# Patient Record
Sex: FEMALE | Race: WHITE | NOT HISPANIC OR LATINO | Employment: STUDENT | URBAN - NONMETROPOLITAN AREA
[De-identification: names, ages, dates, MRNs, and addresses within clinical notes are randomized per-mention and may not be internally consistent; named-entity substitution may affect disease eponyms.]

---
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
## 2024-02-05 DIAGNOSIS — R07.2 PRECORDIAL PAIN: ICD-10-CM

## 2024-02-05 DIAGNOSIS — I47.29 PVT (PAROXYSMAL VENTRICULAR TACHYCARDIA): ICD-10-CM

## 2024-02-05 DIAGNOSIS — I49.8 VENTRICULAR TRIGEMINY: ICD-10-CM

## 2024-02-05 DIAGNOSIS — I49.3 PVC (PREMATURE VENTRICULAR CONTRACTION): ICD-10-CM

## 2024-02-05 DIAGNOSIS — R00.2 PALPITATIONS: ICD-10-CM

## 2024-02-05 RX ORDER — FLECAINIDE ACETATE 50 MG/1
50 TABLET ORAL 2 TIMES DAILY
Qty: 60 TABLET | Refills: 11 | Status: SHIPPED | OUTPATIENT
Start: 2024-02-05

## 2024-04-18 ENCOUNTER — OFFICE VISIT (OUTPATIENT)
Dept: CARDIOLOGY | Facility: CLINIC | Age: 19
End: 2024-04-18
Payer: COMMERCIAL

## 2024-04-18 VITALS
OXYGEN SATURATION: 98 % | BODY MASS INDEX: 22.07 KG/M2 | HEIGHT: 67 IN | SYSTOLIC BLOOD PRESSURE: 103 MMHG | DIASTOLIC BLOOD PRESSURE: 69 MMHG | WEIGHT: 140.6 LBS | HEART RATE: 55 BPM

## 2024-04-18 DIAGNOSIS — I47.29 PVT (PAROXYSMAL VENTRICULAR TACHYCARDIA): ICD-10-CM

## 2024-04-18 DIAGNOSIS — I49.3 PVC (PREMATURE VENTRICULAR CONTRACTION): ICD-10-CM

## 2024-04-18 DIAGNOSIS — I49.8 VENTRICULAR TRIGEMINY: ICD-10-CM

## 2024-04-18 DIAGNOSIS — R00.2 PALPITATIONS: ICD-10-CM

## 2024-04-18 DIAGNOSIS — R07.2 PRECORDIAL PAIN: Primary | ICD-10-CM

## 2024-04-18 PROCEDURE — 1160F RVW MEDS BY RX/DR IN RCRD: CPT | Performed by: NURSE PRACTITIONER

## 2024-04-18 PROCEDURE — 1159F MED LIST DOCD IN RCRD: CPT | Performed by: NURSE PRACTITIONER

## 2024-04-18 PROCEDURE — 99213 OFFICE O/P EST LOW 20 MIN: CPT | Performed by: NURSE PRACTITIONER

## 2024-04-18 PROCEDURE — 93000 ELECTROCARDIOGRAM COMPLETE: CPT | Performed by: NURSE PRACTITIONER

## 2024-04-18 RX ORDER — FLECAINIDE ACETATE 50 MG/1
50 TABLET ORAL 2 TIMES DAILY
Qty: 180 TABLET | Refills: 3 | Status: SHIPPED | OUTPATIENT
Start: 2024-04-18

## 2024-04-18 RX ORDER — CETIRIZINE HYDROCHLORIDE 10 MG/1
10 TABLET ORAL DAILY
COMMUNITY

## 2024-04-18 NOTE — PROGRESS NOTES
Subjective     Kevon Lazcano is a 18 y.o. female who presents to day for 6 month follow up  and Slow Heart Rate.    CHIEF COMPLIANT  Chief Complaint   Patient presents with    6 month follow up     Slow Heart Rate       Active Problems:  Problem List Items Addressed This Visit    None  Visit Diagnoses       Precordial pain    -  Primary    Palpitations        PVC (premature ventricular contraction)                 Problem List :  1.Rapid heart rate .  1.1 cardiac monitor 12/22.  Average heart rate 71 bpm, maximum heart rate 37 maximum 140.  1 run of nonsustained ventricular tachycardia lasting 3 beats with a rate of 126 bpm 7.49% PVC burden with multiple episodes of bigeminy and trigeminy and occasional ventricular couplets.  2. SOB  2.1 echocardiogram 12/22: EF 56 to 60% normal diastolic function with trivial MR  3.  Chest pain  3.1 stress test 12/22: No EKG evidence of ischemia.  Frequent isolated ventricular ectopic beats during exercise this occurred as bigeminy, isolated PVCs, and occasional couplets but no sustained ectopy or block.  Peak heart rate 155    HPI  HPI  Ms. Kevon Lazcano is a 18-year-old female patient who is being followed up today for chest pain and bradycardia.  She is accompanied by an adult female.    Patient continues to have palpitations which she describes as a fluttering like sensation that occurs intermittently in her chest.  It occurs on a frequency of about 1 times a week and at times daily.  Accompanied by chest tightness.  She takes her medications in the morning and night.  She is on flecainide for antiarrhythmic therapy.  She was not a candidate for beta-blocker therapy due to baseline bradycardia.  These are unchanged nonprogressive.  PRIOR MEDS  Current Outpatient Medications on File Prior to Visit   Medication Sig Dispense Refill    cetirizine (zyrTEC) 10 MG tablet Take 1 tablet by mouth Daily.      flecainide (TAMBOCOR) 50 MG tablet TAKE 1 TABLET BY MOUTH TWICE A DAY 60 tablet  11    FLUOXETINE HCL PO Take 20 mg by mouth Daily. 1 1/2 tab po qd      guanFACINE (TENEX) 2 MG tablet Take 0.5 tablets by mouth Every Night.      Levonorgestrel (KYLEENA IU) by Intrauterine route.       No current facility-administered medications on file prior to visit.   ALLERGIES  Other and Sulfa antibiotics    HISTORY  Past Medical History:   Diagnosis Date    ADHD (attention deficit hyperactivity disorder)     Angina pectoris, unspecified     Asthma     Depression     Head injury     Suicide attempt      Social History     Socioeconomic History    Marital status: Single   Tobacco Use    Smoking status: Former     Current packs/day: 0.00     Average packs/day: 0.3 packs/day for 1 year (0.3 ttl pk-yrs)     Types: Cigarettes     Start date:      Quit date:      Years since quittin.2    Smokeless tobacco: Former     Quit date:    Substance and Sexual Activity    Alcohol use: Never    Drug use: Not Currently     Comment: tried ETOH    Sexual activity: Not Currently     Partners: Male     Birth control/protection: OCP       Family History   Problem Relation Age of Onset    Drug abuse Mother     Hypertension Father     Alcohol abuse Father     ADD / ADHD Father     Cancer Father     Schizophrenia Sister     Drug abuse Sister     ADD / ADHD Sister     Depression Sister     Cancer Maternal Grandmother     Heart disease Maternal Grandfather        Review of Systems   Constitutional:  Negative for chills, fatigue and fever.   HENT:  Negative for congestion, rhinorrhea and sore throat.    Eyes:  Negative for visual disturbance.   Respiratory:  Positive for chest tightness. Negative for apnea and shortness of breath.    Cardiovascular:  Positive for chest pain (sharp in center of chest). Negative for palpitations and leg swelling.   Gastrointestinal:  Negative for constipation, diarrhea and nausea.   Musculoskeletal:  Negative for arthralgias, back pain and neck pain.   Allergic/Immunologic: Positive for  "environmental allergies. Negative for food allergies.   Neurological:  Positive for weakness (only if she has syncope this usually is resolved with eating or resting). Negative for dizziness, syncope and light-headedness.   Hematological:  Does not bruise/bleed easily.   Psychiatric/Behavioral:  Negative for sleep disturbance.      Objective     VITALS: /69 (BP Location: Left arm, Patient Position: Sitting, Cuff Size: Adult)   Pulse 55   Ht 170.2 cm (67.01\")   Wt 63.8 kg (140 lb 9.6 oz)   SpO2 98%   BMI 22.02 kg/m²     LABS:   Lab Results (most recent)       None        IMAGING:   No Images in the past 120 days found..  EXAM:  Physical Exam  Vitals and nursing note reviewed.   Constitutional:       Appearance: She is well-developed.   HENT:      Head: Normocephalic.   Neck:      Thyroid: No thyroid mass.      Vascular: No carotid bruit or JVD.      Trachea: Trachea and phonation normal.   Cardiovascular:      Rate and Rhythm: Normal rate and regular rhythm.      Pulses:           Radial pulses are 2+ on the right side and 2+ on the left side.        Posterior tibial pulses are 2+ on the right side and 2+ on the left side.      Heart sounds: Normal heart sounds. No murmur heard.     No friction rub. No gallop.   Pulmonary:      Effort: Pulmonary effort is normal. No respiratory distress.      Breath sounds: Normal breath sounds. No wheezing or rales.   Musculoskeletal:         General: No swelling. Normal range of motion.      Cervical back: Neck supple.   Skin:     General: Skin is warm and dry.      Capillary Refill: Capillary refill takes less than 2 seconds.      Findings: No rash.   Neurological:      Mental Status: She is alert and oriented to person, place, and time.   Psychiatric:         Speech: Speech normal.         Behavior: Behavior normal.         Thought Content: Thought content normal.         Judgment: Judgment normal.     Procedure     ECG 12 Lead    Date/Time: 4/18/2024 2:15 " PM  Performed by: Roman King APRN    Authorized by: Roman King APRN  Rhythm: sinus arrhythmia  Rate: normal  BPM: 68  QRS axis: right  Other findings: non-specific ST-T wave changes  Comments: QTc 444 ms  No acute changes             Assessment & Plan    Diagnosis Plan   1. Precordial pain        2. Palpitations        3. PVC (premature ventricular contraction)        1.  Patient's chest pain and palpitations unchanged nonprogressive.  We will continue to follow at this time.  2.  Patient does have palpitations of PVCs with history of bigeminy trigeminy and nonsustained VT in the past.  She will continue the flecainide therapy.  Patient was not deemed appropriate for beta-blocker therapy due to baseline bradycardia.  3.  Informed of signs and symptoms of ACS and advised to seek emergent treatment for any new worsening symptoms.  Patient also advised sooner follow-up as needed.  Also advised to follow-up with family doctor as needed  This note is dictated utilizing voice recognition software.  Although this record has been proof read, transcriptional errors may still be present. If questions occur regarding the content of this record please do not hesitate to call our office.  I have reviewed and confirmed the accuracy of the ROS as documented by the MA/LPN/RN KILEY Jackson    No follow-ups on file.    Diagnoses and all orders for this visit:    1. Precordial pain (Primary)    2. Palpitations    3. PVC (premature ventricular contraction)    Other orders  -     ECG 12 Lead        Kevon Lazcano  reports that she quit smoking about 4 years ago. Her smoking use included cigarettes. She started smoking about 5 years ago. She has a 0.3 pack-year smoking history. She quit smokeless tobacco use about 7 years ago. I have educated her on the risk of diseases from using tobacco products. Patient does not smoke.           Pediatric BMI = 54 %ile (Z= 0.10) based on CDC (Girls, 2-20 Years) BMI-for-age based on  BMI available as of 4/18/2024.. BMI is within normal parameters. No other follow-up for BMI required.           MEDS ORDERED DURING VISIT:  No orders of the defined types were placed in this encounter.          This document has been electronically signed by Roman King Jr., KILEY  April 18, 2024 14:16 EDT

## 2024-10-21 ENCOUNTER — OFFICE VISIT (OUTPATIENT)
Dept: CARDIOLOGY | Facility: CLINIC | Age: 19
End: 2024-10-21
Payer: COMMERCIAL

## 2024-10-21 VITALS
HEART RATE: 58 BPM | OXYGEN SATURATION: 100 % | WEIGHT: 139.2 LBS | BODY MASS INDEX: 21.85 KG/M2 | SYSTOLIC BLOOD PRESSURE: 117 MMHG | HEIGHT: 67 IN | DIASTOLIC BLOOD PRESSURE: 77 MMHG

## 2024-10-21 DIAGNOSIS — R07.2 PRECORDIAL PAIN: ICD-10-CM

## 2024-10-21 DIAGNOSIS — R00.2 PALPITATIONS: Primary | ICD-10-CM

## 2024-10-21 DIAGNOSIS — I49.8 VENTRICULAR TRIGEMINY: ICD-10-CM

## 2024-10-21 DIAGNOSIS — I47.29 PVT (PAROXYSMAL VENTRICULAR TACHYCARDIA): ICD-10-CM

## 2024-10-21 DIAGNOSIS — R00.1 BRADYCARDIA, SINUS: ICD-10-CM

## 2024-10-21 DIAGNOSIS — I49.3 PVC (PREMATURE VENTRICULAR CONTRACTION): ICD-10-CM

## 2024-10-21 PROCEDURE — 93000 ELECTROCARDIOGRAM COMPLETE: CPT | Performed by: NURSE PRACTITIONER

## 2024-10-21 PROCEDURE — 1159F MED LIST DOCD IN RCRD: CPT | Performed by: NURSE PRACTITIONER

## 2024-10-21 PROCEDURE — 99214 OFFICE O/P EST MOD 30 MIN: CPT | Performed by: NURSE PRACTITIONER

## 2024-10-21 PROCEDURE — 1160F RVW MEDS BY RX/DR IN RCRD: CPT | Performed by: NURSE PRACTITIONER

## 2024-10-21 NOTE — PROGRESS NOTES
Subjective     Kevon Lazcano is a 19 y.o. female who presents to day for 6 month  follow up  and Slow Heart Rate.    CHIEF COMPLIANT  Chief Complaint   Patient presents with    6 month  follow up     Slow Heart Rate       Active Problems:  Problem List Items Addressed This Visit    None  Visit Diagnoses       Palpitations    -  Primary    Relevant Orders    ECG 12 Lead    Holter Monitor - 72 Hour Up To 15 Days    Ventricular trigeminy        Relevant Orders    ECG 12 Lead    Holter Monitor - 72 Hour Up To 15 Days    PVC (premature ventricular contraction)        Relevant Orders    ECG 12 Lead    Holter Monitor - 72 Hour Up To 15 Days    PVT (paroxysmal ventricular tachycardia)        Relevant Orders    ECG 12 Lead    Holter Monitor - 72 Hour Up To 15 Days    Bradycardia, sinus        Relevant Orders    ECG 12 Lead    Holter Monitor - 72 Hour Up To 15 Days    Precordial pain        Relevant Orders    ECG 12 Lead    Holter Monitor - 72 Hour Up To 15 Days        Problem List :  1.Rapid heart rate .  1.1 cardiac monitor 12/22.  Average heart rate 71 bpm, maximum heart rate 37 maximum 140.  1 run of nonsustained ventricular tachycardia lasting 3 beats with a rate of 126 bpm 7.49% PVC burden with multiple episodes of bigeminy and trigeminy and occasional ventricular couplets.  2. SOB  2.1 echocardiogram 12/22: EF 56 to 60% normal diastolic function with trivial MR  3.  Chest pain  3.1 stress test 12/22: No EKG evidence of ischemia.  Frequent isolated ventricular ectopic beats during exercise this occurred as bigeminy, isolated PVCs, and occasional couplets but no sustained ectopy or block.  Peak heart rate 155    HPI  HPI  MsChanelle Lazcano is a 19-year-old female patient being followed up today for palpitations.    Patient does have a significant history of palpitations in which she did wear a cardiac event monitor previously.  The event monitor did identify an Average heart rate 71 bpm, maximum heart rate 37 maximum  140.  1 run of nonsustained ventricular tachycardia lasting 3 beats with a rate of 126 bpm 7.49% PVC burden with multiple episodes of bigeminy and trigeminy and occasional ventricular couplets.  At this time due to baseline bradycardia she was placed on flecainide.    Patient does have palpitations in which she has an intermittent fluttering like sensation that occurs in her chest about 1 time a day lasting up to 30 minutes.  She did have unifocal PVCs noted on her EKG today.  She has associated chest pain that sharp in nature that occurs with her palpitations.  She does say these are improved since starting the flecainide but still occur on a daily basis as well.  PRIOR MEDS  Current Outpatient Medications on File Prior to Visit   Medication Sig Dispense Refill    cetirizine (zyrTEC) 10 MG tablet Take 1 tablet by mouth Daily.      flecainide (TAMBOCOR) 50 MG tablet Take 1 tablet by mouth 2 (Two) Times a Day. 180 tablet 3    Levonorgestrel (KYLEENA IU) by Intrauterine route.      [DISCONTINUED] FLUOXETINE HCL PO Take 20 mg by mouth Daily. 1 1/2 tab po qd      [DISCONTINUED] guanFACINE (TENEX) 2 MG tablet Take 0.5 tablets by mouth Every Night.       No current facility-administered medications on file prior to visit.       ALLERGIES  Other and Sulfa antibiotics    HISTORY  Past Medical History:   Diagnosis Date    ADHD (attention deficit hyperactivity disorder)     Angina pectoris, unspecified     Asthma     Depression     Head injury     Suicide attempt        Social History     Socioeconomic History    Marital status: Single   Tobacco Use    Smoking status: Former     Current packs/day: 0.00     Average packs/day: 0.3 packs/day for 1 year (0.3 ttl pk-yrs)     Types: Cigarettes     Start date:      Quit date:      Years since quittin.8    Smokeless tobacco: Former     Quit date:    Substance and Sexual Activity    Alcohol use: Never    Drug use: Not Currently     Comment: tried ETOH    Sexual  "activity: Not Currently     Partners: Male     Birth control/protection: OCP       Family History   Problem Relation Age of Onset    Drug abuse Mother     Hypertension Father     Alcohol abuse Father     ADD / ADHD Father     Cancer Father     Schizophrenia Sister     Drug abuse Sister     ADD / ADHD Sister     Depression Sister     Cancer Maternal Grandmother     Heart disease Maternal Grandfather        Review of Systems   Constitutional:  Negative for chills, fatigue and fever.   HENT:  Negative for congestion, rhinorrhea and sore throat.    Eyes:  Negative for visual disturbance.   Respiratory:  Positive for chest tightness. Negative for apnea and shortness of breath.    Cardiovascular:  Positive for chest pain (sharp sometimes) and palpitations (flutters). Negative for leg swelling.   Gastrointestinal:  Negative for constipation, diarrhea and nausea.   Musculoskeletal:  Negative for arthralgias, back pain and neck pain.   Skin:  Negative for rash and wound.   Allergic/Immunologic: Positive for environmental allergies. Negative for food allergies.   Neurological:  Positive for dizziness (if she takes a hot shower) and light-headedness. Negative for syncope and weakness.   Hematological:  Does not bruise/bleed easily.   Psychiatric/Behavioral:  Negative for sleep disturbance.        Objective     VITALS: /77 (BP Location: Right arm, Patient Position: Sitting, Cuff Size: Adult)   Pulse 58   Ht 170.2 cm (67.01\")   Wt 63.1 kg (139 lb 3.2 oz)   SpO2 100%   BMI 21.80 kg/m²     LABS:   Lab Results (most recent)       None            IMAGING:   No Images in the past 120 days found..    EXAM:  Physical Exam  Vitals and nursing note reviewed.   Constitutional:       Appearance: She is well-developed.   HENT:      Head: Normocephalic.   Neck:      Thyroid: No thyroid mass.      Vascular: No carotid bruit or JVD.      Trachea: Trachea and phonation normal.   Cardiovascular:      Rate and Rhythm: Normal rate and " regular rhythm. Frequent Extrasystoles are present.     Pulses:           Radial pulses are 2+ on the right side and 2+ on the left side.        Posterior tibial pulses are 2+ on the right side and 2+ on the left side.      Heart sounds: Normal heart sounds. No murmur heard.     No friction rub. No gallop.   Pulmonary:      Effort: Pulmonary effort is normal. No respiratory distress.      Breath sounds: Normal breath sounds. No wheezing or rales.   Musculoskeletal:         General: No swelling. Normal range of motion.      Cervical back: Neck supple.   Skin:     General: Skin is warm and dry.      Capillary Refill: Capillary refill takes less than 2 seconds.      Findings: No rash.   Neurological:      Mental Status: She is alert and oriented to person, place, and time.   Psychiatric:         Speech: Speech normal.         Behavior: Behavior normal.         Thought Content: Thought content normal.         Judgment: Judgment normal.         Procedure     ECG 12 Lead    Date/Time: 10/21/2024 1:33 PM  Performed by: Roman King APRN    Authorized by: Roman King APRN  Comparison: compared with previous ECG from 4/18/2024  Similar to previous ECG  Rhythm: sinus rhythm  Ectopy: unifocal PVCs  Rate: normal  BPM: 82  QRS axis: right  Other findings: non-specific ST-T wave changes  Comments:  MS  nO ACUTE CHANGES             Assessment & Plan    Diagnosis Plan   1. Palpitations  ECG 12 Lead    Holter Monitor - 72 Hour Up To 15 Days      2. Ventricular trigeminy  ECG 12 Lead    Holter Monitor - 72 Hour Up To 15 Days      3. PVC (premature ventricular contraction)  ECG 12 Lead    Holter Monitor - 72 Hour Up To 15 Days      4. PVT (paroxysmal ventricular tachycardia)  ECG 12 Lead    Holter Monitor - 72 Hour Up To 15 Days      5. Bradycardia, sinus  ECG 12 Lead    Holter Monitor - 72 Hour Up To 15 Days      6. Precordial pain  ECG 12 Lead    Holter Monitor - 72 Hour Up To 15 Days      1.  Due to patient's  ongoing palpitations as well as PVCs on her monitor today despite antiarrhythmic therapy of flecainide I would like for her to have a repeat Holter monitor for 2 weeks to determine the frequency of her palpitations.    2.  Patient does report chest pain that only occurs with the palpitations.  This does seem to be less frequent than what it was previously.    3.  Informed of signs and symptoms of ACS and advised to seek emergent treatment for any new worsening symptoms.  Patient also advised sooner follow-up as needed.  Also advised to follow-up with family doctor as needed  This note is dictated utilizing voice recognition software.  Although this record has been proof read, transcriptional errors may still be present. If questions occur regarding the content of this record please do not hesitate to call our office.  I have reviewed and confirmed the accuracy of the ROS as documented by the MA/LPN/RN KILEY Jackson  Return in about 6 months (around 4/21/2025), or if symptoms worsen or fail to improve.    Diagnoses and all orders for this visit:    1. Palpitations (Primary)  -     ECG 12 Lead  -     Holter Monitor - 72 Hour Up To 15 Days; Future    2. Ventricular trigeminy  -     ECG 12 Lead  -     Holter Monitor - 72 Hour Up To 15 Days; Future    3. PVC (premature ventricular contraction)  -     ECG 12 Lead  -     Holter Monitor - 72 Hour Up To 15 Days; Future    4. PVT (paroxysmal ventricular tachycardia)  -     ECG 12 Lead  -     Holter Monitor - 72 Hour Up To 15 Days; Future    5. Bradycardia, sinus  -     ECG 12 Lead  -     Holter Monitor - 72 Hour Up To 15 Days; Future    6. Precordial pain  -     ECG 12 Lead  -     Holter Monitor - 72 Hour Up To 15 Days; Future        Kevon Lazcano  reports that she quit smoking about 4 years ago. Her smoking use included cigarettes. She started smoking about 5 years ago. She has a 0.3 pack-year smoking history. She quit smokeless tobacco use about 7 years ago. I have  educated her on the risk of diseases from using tobacco products. Patient does not smoke.            BMI is within normal parameters. No other follow-up for BMI required.           MEDS ORDERED DURING VISIT:  No orders of the defined types were placed in this encounter.          This document has been electronically signed by Roman King Jr., APRN  October 21, 2024 13:58 EDT

## 2025-01-07 ENCOUNTER — TELEPHONE (OUTPATIENT)
Dept: CARDIOLOGY | Facility: CLINIC | Age: 20
End: 2025-01-07
Payer: COMMERCIAL

## 2025-01-07 NOTE — TELEPHONE ENCOUNTER
RELAY    I called and left a message on voicemail with monitor results as follows:    Improvement of PVC burden from 7.9% down to 1%.  Will discuss further on follow-up.

## 2025-04-22 ENCOUNTER — OFFICE VISIT (OUTPATIENT)
Dept: CARDIOLOGY | Facility: CLINIC | Age: 20
End: 2025-04-22
Payer: COMMERCIAL

## 2025-04-22 VITALS
WEIGHT: 141.4 LBS | SYSTOLIC BLOOD PRESSURE: 109 MMHG | OXYGEN SATURATION: 99 % | DIASTOLIC BLOOD PRESSURE: 71 MMHG | HEART RATE: 55 BPM | BODY MASS INDEX: 22.19 KG/M2 | HEIGHT: 67 IN

## 2025-04-22 DIAGNOSIS — R00.2 PALPITATIONS: ICD-10-CM

## 2025-04-22 DIAGNOSIS — I49.3 PVC (PREMATURE VENTRICULAR CONTRACTION): ICD-10-CM

## 2025-04-22 DIAGNOSIS — I47.20 PVT (PAROXYSMAL VENTRICULAR TACHYCARDIA): ICD-10-CM

## 2025-04-22 DIAGNOSIS — I49.8 VENTRICULAR TRIGEMINY: ICD-10-CM

## 2025-04-22 DIAGNOSIS — R07.2 PRECORDIAL PAIN: Primary | ICD-10-CM

## 2025-04-22 PROCEDURE — 93000 ELECTROCARDIOGRAM COMPLETE: CPT | Performed by: NURSE PRACTITIONER

## 2025-04-22 PROCEDURE — 1160F RVW MEDS BY RX/DR IN RCRD: CPT | Performed by: NURSE PRACTITIONER

## 2025-04-22 PROCEDURE — 99214 OFFICE O/P EST MOD 30 MIN: CPT | Performed by: NURSE PRACTITIONER

## 2025-04-22 PROCEDURE — 1159F MED LIST DOCD IN RCRD: CPT | Performed by: NURSE PRACTITIONER

## 2025-04-22 RX ORDER — FLECAINIDE ACETATE 50 MG/1
50 TABLET ORAL 2 TIMES DAILY
Qty: 180 TABLET | Refills: 3 | Status: SHIPPED | OUTPATIENT
Start: 2025-04-22 | End: 2025-04-23 | Stop reason: SDUPTHER

## 2025-04-22 NOTE — PROGRESS NOTES
Subjective     Kevon Lazcano is a 19 y.o. female who presents to day for Follow-up, Palpitations (Racing), and Chest Pain (Patient had some chest pain today when cleaning out the car. She has this off and on .).    CHIEF COMPLIANT  Chief Complaint   Patient presents with    Follow-up    Palpitations     Racing    Chest Pain     Patient had some chest pain today when cleaning out the car. She has this off and on .       Active Problems:  Problem List Items Addressed This Visit    None  Visit Diagnoses         Precordial pain    -  Primary    Relevant Orders    ECG 12 Lead    Treadmill Stress Test      Palpitations        Relevant Orders    ECG 12 Lead    Treadmill Stress Test      PVC (premature ventricular contraction)        Relevant Orders    ECG 12 Lead    Treadmill Stress Test      Ventricular trigeminy        Relevant Orders    ECG 12 Lead    Treadmill Stress Test      PVT (paroxysmal ventricular tachycardia)        Relevant Orders    ECG 12 Lead    Treadmill Stress Test        Problem List :  1.Rapid heart rate .  1.1 cardiac monitor 12/22.  Average heart rate 71 bpm, maximum heart rate 37 maximum 140.  1 run of nonsustained ventricular tachycardia lasting 3 beats with a rate of 126 bpm 7.49% PVC burden with multiple episodes of bigeminy and trigeminy and occasional ventricular couplets.  2. SOB  2.1 echocardiogram 12/22: EF 56 to 60% normal diastolic function with trivial MR  3.  Chest pain  3.1 stress test 12/22: No EKG evidence of ischemia.  Frequent isolated ventricular ectopic beats during exercise this occurred as bigeminy, isolated PVCs, and occasional couplets but no sustained ectopy or block.  Peak heart rate 155    HPI  HPI  Ms. Kevon Lazcano is a 19-year-old female patient being followed up today for palpitatons.    Due to exacerbation of palpitations patient did wear a Holter monitor for reevaluation.  She had no reported symptoms.  She is predominantly in a sinus rhythm.  She did have an episode  of 1-1 AV block at 11:55 PM and at 7:28 AM.  She had a PVC burden of 1% which was 7.9% prior to flecainide therapy.  She had an episode of ventricular bigeminy lasting up to 8.3 seconds and episodes of ventricular trigeminy lasting up to 5.4 seconds.    Patient does report chest pain that occurred as recent as today.  She says that it occurs both with rest and exertion.  Said today when she developed the chest pain that lasted for about a minute she was cleaning out her car.  She denied any associated symptoms.  Says her chest pain still occurs about 1 time a week.    Patient does have palpitations where she has intermittent racing sensation that occurs in her chest.  Previously patient had a 7.49% PVC burden with multiple episodes of bigeminy and trigeminy.  A monitor was repeated as discussed above and her PVC burden had dropped to approximately 1%.    Patient denies any shortness of breath, lower extremity edema, fatigue, syncope, PND, orthopnea, or strokelike symptoms.  PRIOR MEDS  Current Outpatient Medications on File Prior to Visit   Medication Sig Dispense Refill    cetirizine (zyrTEC) 10 MG tablet Take 1 tablet by mouth Daily.      Levonorgestrel (KYLEENA IU) by Intrauterine route.       No current facility-administered medications on file prior to visit.       ALLERGIES  Other and Sulfa antibiotics    HISTORY  Past Medical History:   Diagnosis Date    ADHD (attention deficit hyperactivity disorder)     Angina pectoris, unspecified     Asthma     Depression     Head injury     Suicide attempt        Social History     Socioeconomic History    Marital status: Single   Tobacco Use    Smoking status: Former     Current packs/day: 0.00     Average packs/day: 0.3 packs/day for 1 year (0.3 ttl pk-yrs)     Types: Cigarettes     Start date:      Quit date:      Years since quittin.3    Smokeless tobacco: Former     Quit date:    Substance and Sexual Activity    Alcohol use: Never    Drug use: Not  "Currently     Comment: tried ETOH    Sexual activity: Not Currently     Partners: Male     Birth control/protection: OCP       Family History   Problem Relation Age of Onset    Drug abuse Mother     Hypertension Father     Alcohol abuse Father     ADD / ADHD Father     Cancer Father     Schizophrenia Sister     Drug abuse Sister     ADD / ADHD Sister     Depression Sister     Cancer Maternal Grandmother     Heart disease Maternal Grandfather        Review of Systems   Constitutional:  Negative for chills, fatigue and fever.   HENT:  Negative for congestion, rhinorrhea and sore throat.    Eyes:  Negative for visual disturbance.   Respiratory:  Negative for apnea, chest tightness and shortness of breath.    Cardiovascular:  Positive for chest pain (had some chest pain today cleaning out the car) and palpitations (races). Negative for leg swelling.   Gastrointestinal:  Negative for constipation, diarrhea and nausea.   Musculoskeletal:  Negative for arthralgias, back pain and neck pain.   Skin:  Negative for rash and wound.   Allergic/Immunologic: Positive for environmental allergies and food allergies (lilly magana).   Neurological:  Negative for dizziness, syncope, weakness and light-headedness.   Hematological:  Bruises/bleeds easily (bruise).   Psychiatric/Behavioral:  Negative for sleep disturbance.        Objective     VITALS: /71 (BP Location: Right arm, Patient Position: Sitting, Cuff Size: Adult)   Pulse 55   Ht 170.2 cm (67.01\")   Wt 64.1 kg (141 lb 6.4 oz)   SpO2 99%   BMI 22.14 kg/m²     LABS:   Lab Results (most recent)       None            IMAGING:   No Images in the past 120 days found..    EXAM:  Physical Exam  Vitals and nursing note reviewed.   Constitutional:       Appearance: She is well-developed.   HENT:      Head: Normocephalic.   Neck:      Thyroid: No thyroid mass.      Vascular: No carotid bruit or JVD.      Trachea: Trachea and phonation normal.   Cardiovascular:      Rate and " Rhythm: Normal rate and regular rhythm.      Pulses:           Radial pulses are 2+ on the right side and 2+ on the left side.        Posterior tibial pulses are 2+ on the right side and 2+ on the left side.      Heart sounds: Normal heart sounds. No murmur heard.     No friction rub. No gallop.   Pulmonary:      Effort: Pulmonary effort is normal. No respiratory distress.      Breath sounds: Normal breath sounds. No wheezing or rales.   Musculoskeletal:         General: No swelling. Normal range of motion.      Cervical back: Neck supple.   Skin:     General: Skin is warm and dry.      Capillary Refill: Capillary refill takes less than 2 seconds.      Findings: No rash.   Neurological:      Mental Status: She is alert and oriented to person, place, and time.   Psychiatric:         Speech: Speech normal.         Behavior: Behavior normal.         Thought Content: Thought content normal.         Judgment: Judgment normal.         Procedure     ECG 12 Lead    Date/Time: 4/22/2025 2:38 PM  Performed by: Roman King APRN    Authorized by: Roman King APRN  Comparison: compared with previous ECG from 10/21/2024  Comparison to previous ECG: No nonspecific changes in lead III  Rhythm: sinus arrhythmia  Rate: normal  BPM: 66  QRS axis: right  Other findings: non-specific ST-T wave changes  Comments: QTc 384 ms  No acute changes             Assessment & Plan    Diagnosis Plan   1. Precordial pain  ECG 12 Lead    Treadmill Stress Test      2. Palpitations  ECG 12 Lead    Treadmill Stress Test      3. PVC (premature ventricular contraction)  ECG 12 Lead    Treadmill Stress Test      4. Ventricular trigeminy  ECG 12 Lead    Treadmill Stress Test      5. PVT (paroxysmal ventricular tachycardia)  ECG 12 Lead    Treadmill Stress Test      1.  Due to patient's continuation of chest pain and history of PVC burden I would like for her to repeat treadmill stress test to evaluate for ischemia or exercise-induced  dysrhythmia.  2.  Due to the significant reduction of palpitations with the flecainide from a PVC burden nearing 8% down to 1% we will continue without change.  3.  Informed of signs and symptoms of ACS and advised to seek emergent treatment for any new worsening symptoms.  Patient also advised sooner follow-up as needed.  Also advised to follow-up with family doctor as needed  This note is dictated utilizing voice recognition software.  Although this record has been proof read, transcriptional errors may still be present. If questions occur regarding the content of this record please do not hesitate to call our office.  I have reviewed and confirmed the accuracy of the ROS as documented by the MA/LPN/RN KILEY Jackson    Return if symptoms worsen or fail to improve, for Next scheduled follow up.    Diagnoses and all orders for this visit:    1. Precordial pain (Primary)  -     ECG 12 Lead  -     Discontinue: flecainide (TAMBOCOR) 50 MG tablet; Take 1 tablet by mouth 2 (Two) Times a Day.  Dispense: 180 tablet; Refill: 3  -     Treadmill Stress Test; Future    2. Palpitations  -     ECG 12 Lead  -     Discontinue: flecainide (TAMBOCOR) 50 MG tablet; Take 1 tablet by mouth 2 (Two) Times a Day.  Dispense: 180 tablet; Refill: 3  -     Treadmill Stress Test; Future    3. PVC (premature ventricular contraction)  -     ECG 12 Lead  -     Discontinue: flecainide (TAMBOCOR) 50 MG tablet; Take 1 tablet by mouth 2 (Two) Times a Day.  Dispense: 180 tablet; Refill: 3  -     Treadmill Stress Test; Future    4. Ventricular trigeminy  -     ECG 12 Lead  -     Discontinue: flecainide (TAMBOCOR) 50 MG tablet; Take 1 tablet by mouth 2 (Two) Times a Day.  Dispense: 180 tablet; Refill: 3  -     Treadmill Stress Test; Future    5. PVT (paroxysmal ventricular tachycardia)  -     ECG 12 Lead  -     Discontinue: flecainide (TAMBOCOR) 50 MG tablet; Take 1 tablet by mouth 2 (Two) Times a Day.  Dispense: 180 tablet; Refill: 3  -      Treadmill Stress Test; Future        Kevon Lazcano  reports that she quit smoking about 5 years ago. Her smoking use included cigarettes. She started smoking about 6 years ago. She has a 0.3 pack-year smoking history. She quit smokeless tobacco use about 8 years ago. I have educated her on the risk of diseases from using tobacco products. Patient does not smoke.             DO YOU VAPE OR USE SMOKELESS TOBACCO PRODUCTS?No    BMI is within normal parameters. No other follow-up for BMI required.           MEDS ORDERED DURING VISIT:  No orders of the defined types were placed in this encounter.          This document has been electronically signed by Roman King Jr., APRN  April 25, 2025 08:48 EDT

## 2025-04-23 ENCOUNTER — TELEPHONE (OUTPATIENT)
Dept: CARDIOLOGY | Facility: CLINIC | Age: 20
End: 2025-04-23
Payer: COMMERCIAL

## 2025-04-23 DIAGNOSIS — R00.2 PALPITATIONS: ICD-10-CM

## 2025-04-23 DIAGNOSIS — R07.2 PRECORDIAL PAIN: ICD-10-CM

## 2025-04-23 DIAGNOSIS — I47.20 PVT (PAROXYSMAL VENTRICULAR TACHYCARDIA): ICD-10-CM

## 2025-04-23 DIAGNOSIS — I49.8 VENTRICULAR TRIGEMINY: ICD-10-CM

## 2025-04-23 DIAGNOSIS — I49.3 PVC (PREMATURE VENTRICULAR CONTRACTION): ICD-10-CM

## 2025-04-23 RX ORDER — FLECAINIDE ACETATE 50 MG/1
50 TABLET ORAL 2 TIMES DAILY
Qty: 180 TABLET | Refills: 3 | Status: SHIPPED | OUTPATIENT
Start: 2025-04-23 | End: 2025-04-24 | Stop reason: SDUPTHER

## 2025-04-24 DIAGNOSIS — R07.2 PRECORDIAL PAIN: ICD-10-CM

## 2025-04-24 DIAGNOSIS — R00.2 PALPITATIONS: ICD-10-CM

## 2025-04-24 DIAGNOSIS — I47.20 PVT (PAROXYSMAL VENTRICULAR TACHYCARDIA): ICD-10-CM

## 2025-04-24 DIAGNOSIS — I49.3 PVC (PREMATURE VENTRICULAR CONTRACTION): ICD-10-CM

## 2025-04-24 DIAGNOSIS — I49.8 VENTRICULAR TRIGEMINY: ICD-10-CM

## 2025-04-24 RX ORDER — FLECAINIDE ACETATE 50 MG/1
50 TABLET ORAL EVERY 12 HOURS SCHEDULED
Qty: 180 TABLET | Refills: 3 | Status: SHIPPED | OUTPATIENT
Start: 2025-04-24

## 2025-04-24 RX ORDER — FLECAINIDE ACETATE 50 MG/1
50 TABLET ORAL 2 TIMES DAILY
Qty: 180 TABLET | Refills: 3 | Status: SHIPPED | OUTPATIENT
Start: 2025-04-24 | End: 2025-04-24

## 2025-04-24 NOTE — TELEPHONE ENCOUNTER
Caller: Neno Kevon    Relationship: Self    Best call back number: [phone]    Requested Prescriptions:   Requested Prescriptions     Pending Prescriptions Disp Refills    flecainide (TAMBOCOR) 50 MG tablet 180 tablet 3     Sig: Take 1 tablet by mouth 2 (Two) Times a Day.        Pharmacy where request should be sent: [id number] [place] [facility] [address] [address] - [phone] [facility] [phone] FX     Last office visit with prescribing clinician: 4/22/2025   Last telemedicine visit with prescribing clinician: Visit date not found   Next office visit with prescribing clinician: 9/23/2025     Additional details provided by patient: PHARMACY IS SAYING THEY NEVER GOT REFILL PLEASE ADVISE    Does the patient have less than a 3 day supply:  [x] Yes  [] No    Would you like a call back once the refill request has been completed: [x] Yes [] No    If the office needs to give you a call back, can they leave a voicemail: [x] Yes [] No    Omar Denise Rep   04/24/25 09:43 EDT

## 2025-05-21 ENCOUNTER — HOSPITAL ENCOUNTER (OUTPATIENT)
Dept: CARDIOLOGY | Facility: HOSPITAL | Age: 20
Discharge: HOME | End: 2025-05-21
Admitting: NURSE PRACTITIONER
Payer: COMMERCIAL

## 2025-05-21 ENCOUNTER — TELEPHONE (OUTPATIENT)
Dept: CARDIOLOGY | Facility: CLINIC | Age: 20
End: 2025-05-21
Payer: COMMERCIAL

## 2025-05-21 DIAGNOSIS — I49.3 PVC (PREMATURE VENTRICULAR CONTRACTION): ICD-10-CM

## 2025-05-21 DIAGNOSIS — R07.2 PRECORDIAL PAIN: ICD-10-CM

## 2025-05-21 DIAGNOSIS — I47.20 PVT (PAROXYSMAL VENTRICULAR TACHYCARDIA): ICD-10-CM

## 2025-05-21 DIAGNOSIS — R00.2 PALPITATIONS: ICD-10-CM

## 2025-05-21 DIAGNOSIS — I49.8 VENTRICULAR TRIGEMINY: ICD-10-CM

## 2025-05-21 PROCEDURE — 93017 CV STRESS TEST TRACING ONLY: CPT

## 2025-05-22 ENCOUNTER — TELEPHONE (OUTPATIENT)
Dept: CARDIOLOGY | Facility: CLINIC | Age: 20
End: 2025-05-22
Payer: COMMERCIAL

## 2025-05-22 NOTE — TELEPHONE ENCOUNTER
Received cardiac clearance request from [facility] REQUESTING PERMISSION TO DONATE and is requiring a cardiac clearance. Placed cardiac clearance request in SETH'S inbox to review and address with provider.

## 2025-05-26 LAB
BH CV STRESS RECOVERY BP: NORMAL MMHG
BH CV STRESS RECOVERY HR: 100 BPM
MAXIMAL PREDICTED HEART RATE: 200 BPM
PERCENT MAX PREDICTED HR: 77.5 %
STRESS BASELINE BP: NORMAL MMHG
STRESS BASELINE HR: 83 BPM
STRESS PERCENT HR: 91 %
STRESS POST ESTIMATED WORKLOAD: 7 METS
STRESS POST EXERCISE DUR MIN: 6 MIN
STRESS POST PEAK BP: NORMAL MMHG
STRESS POST PEAK HR: 155 BPM
STRESS TARGET HR: 170 BPM

## 2025-05-27 ENCOUNTER — RESULTS FOLLOW-UP (OUTPATIENT)
Dept: CARDIOLOGY | Facility: CLINIC | Age: 20
End: 2025-05-27
Payer: COMMERCIAL

## 2025-05-27 DIAGNOSIS — R00.1 BRADYCARDIA, SINUS: ICD-10-CM

## 2025-05-27 DIAGNOSIS — R07.2 PRECORDIAL PAIN: ICD-10-CM

## 2025-05-27 DIAGNOSIS — R00.2 PALPITATIONS: Primary | ICD-10-CM

## 2025-05-27 DIAGNOSIS — I49.8 VENTRICULAR TRIGEMINY: ICD-10-CM

## 2025-05-27 NOTE — TELEPHONE ENCOUNTER
Relay     I sent mother a thereNow message with results as follows:      Roman King APRN to Me (Selected Message)      5/27/25  8:35 AM  Note      Negative treadmill stress test.  But I would like to get a echocardiogram with bubble study to rule out right ventricular hypertrophy and left-to-right shunt.         Treadmill Stress Test

## 2025-05-27 NOTE — PROGRESS NOTES
Negative treadmill stress test.  But I would like to get a echocardiogram with bubble study to rule out right ventricular hypertrophy and left-to-right shunt.

## 2025-06-10 ENCOUNTER — HOSPITAL ENCOUNTER (OUTPATIENT)
Dept: CARDIOLOGY | Facility: HOSPITAL | Age: 20
Discharge: HOME | End: 2025-06-10
Admitting: NURSE PRACTITIONER
Payer: COMMERCIAL

## 2025-06-10 DIAGNOSIS — R07.2 PRECORDIAL PAIN: ICD-10-CM

## 2025-06-10 DIAGNOSIS — R00.2 PALPITATIONS: ICD-10-CM

## 2025-06-10 DIAGNOSIS — I49.8 VENTRICULAR TRIGEMINY: ICD-10-CM

## 2025-06-10 DIAGNOSIS — R00.1 BRADYCARDIA, SINUS: ICD-10-CM

## 2025-06-10 LAB
AV MEAN PRESS GRAD SYS DOP V1V2: 2.39 MMHG
AV VMAX SYS DOP: 100.4 CM/SEC
BH CV ECHO MEAS - ACS: 1.97 CM
BH CV ECHO MEAS - AO MAX PG: 4 MMHG
BH CV ECHO MEAS - AO ROOT DIAM: 2.6 CM
BH CV ECHO MEAS - AO V2 VTI: 24 CM
BH CV ECHO MEAS - EDV(CUBED): 82.9 ML
BH CV ECHO MEAS - EDV(MOD-SP4): 74.7 ML
BH CV ECHO MEAS - EF(MOD-SP4): 54.4 %
BH CV ECHO MEAS - ESV(CUBED): 39.7 ML
BH CV ECHO MEAS - ESV(MOD-SP4): 34.1 ML
BH CV ECHO MEAS - FS: 21.8 %
BH CV ECHO MEAS - IVS/LVPW: 0.94 CM
BH CV ECHO MEAS - IVSD: 0.95 CM
BH CV ECHO MEAS - LA DIMENSION: 2.24 CM
BH CV ECHO MEAS - LAT PEAK E' VEL: 14.1 CM/SEC
BH CV ECHO MEAS - LV DIASTOLIC VOL/BSA (35-75): 42.9 CM2
BH CV ECHO MEAS - LV MASS(C)D: 141.8 GRAMS
BH CV ECHO MEAS - LV SYSTOLIC VOL/BSA (12-30): 19.6 CM2
BH CV ECHO MEAS - LVIDD: 4.4 CM
BH CV ECHO MEAS - LVIDS: 3.4 CM
BH CV ECHO MEAS - LVPWD: 1.01 CM
BH CV ECHO MEAS - MED PEAK E' VEL: 9.6 CM/SEC
BH CV ECHO MEAS - MV A MAX VEL: 70.3 CM/SEC
BH CV ECHO MEAS - MV DEC TIME: 0.31 SEC
BH CV ECHO MEAS - MV E MAX VEL: 65.6 CM/SEC
BH CV ECHO MEAS - MV E/A: 0.93
BH CV ECHO MEAS - RAP SYSTOLE: 10 MMHG
BH CV ECHO MEAS - RVDD: 1.97 CM
BH CV ECHO MEAS - RVSP: 22.6 MMHG
BH CV ECHO MEAS - SV(MOD-SP4): 40.6 ML
BH CV ECHO MEAS - SVI(MOD-SP4): 23.3 ML/M2
BH CV ECHO MEAS - TR MAX PG: 12.6 MMHG
BH CV ECHO MEAS - TR MAX VEL: 177.8 CM/SEC
BH CV ECHO MEASUREMENTS AVERAGE E/E' RATIO: 5.54
IVRT: 106 MS
LEFT ATRIUM VOLUME INDEX: 6.8 ML/M2
LV EF 3D SEGMENTATION: 56 %

## 2025-06-10 PROCEDURE — 93306 TTE W/DOPPLER COMPLETE: CPT

## 2025-06-10 RX ORDER — SODIUM CHLORIDE 9 MG/ML
8 INJECTION INTRAMUSCULAR; INTRAVENOUS; SUBCUTANEOUS ONCE AS NEEDED
Status: COMPLETED | OUTPATIENT
Start: 2025-06-10 | End: 2025-06-10

## 2025-06-10 RX ADMIN — SODIUM CHLORIDE 8 ML: 9 INJECTION, SOLUTION INTRAMUSCULAR; INTRAVENOUS; SUBCUTANEOUS at 14:12
